# Patient Record
(demographics unavailable — no encounter records)

---

## 2024-10-22 NOTE — HISTORY OF PRESENT ILLNESS
[de-identified] : 27-year-old male is here today for evaluation of his left ankle.  Patient states on October 10th he slipped and fell injuring his ankle.  He states he went to an urgent care and then went to an orthopedic in [place].  He states he went there because he was leaving the next day to go on vacation, he states he cannot continue to follow-up there because they do not take his insurance.  He has been in a cam walker boot using a knee scooter.  He is here today for follow-up.  He denies any numbness tingling or any calf pain.

## 2024-10-22 NOTE — DISCUSSION/SUMMARY
[de-identified] : At this time he was uncomfortable in the boot that he had so he was placed in a new tall cam walker boot.  I discussed with him he can start to weight-bear as tolerated in the boot.  Rest, ice, elevate, anti-inflammatories as needed for pain.  Will see him back again in 2 weeks for repeat x-rays and evaluation. Patient will call me if any other problems or concerns.  Patient verbalized understanding and agreed with the plan, all questions were answered in the office today.

## 2024-10-22 NOTE — IMAGING
[de-identified] : On examination of his left ankle and foot he has mild swelling, no erythema, no ecchymosis.  He is tender over the lateral malleolus, mildly tender over the ATFL and CFL.  No tenderness over the medial malleolus or the deltoid ligament.  No tenderness over the talar dome.  Tenderness over the Achilles of the calcaneus, negative Harrison's test, no calf tenderness.  He has some tenderness to palpation over the lateral aspect of the foot.  No tenderness over the Lisfranc joint.  He is able to dorsiflex and plantarflex ankle, sensation is intact throughout, 2+ DP and PT pulses.  X-rays repeated in the office today of the left ankle and foot show a nondisplaced spiral fracture of the lateral malleolus.  There is no widening of the medial clear space or the syndesmosis.  No other fractures, dislocations, or other bony abnormalities noted.